# Patient Record
Sex: FEMALE | Race: WHITE | NOT HISPANIC OR LATINO
[De-identification: names, ages, dates, MRNs, and addresses within clinical notes are randomized per-mention and may not be internally consistent; named-entity substitution may affect disease eponyms.]

---

## 2019-04-10 ENCOUNTER — HOSPITAL (OUTPATIENT)
Dept: OTHER | Age: 34
End: 2019-04-10
Attending: OBSTETRICS & GYNECOLOGY

## 2020-09-14 PROBLEM — O09.819 PREGNANCY RESULTING FROM ASSISTED REPRODUCTIVE TECHNOLOGY, ANTEPARTUM: Status: ACTIVE | Noted: 2020-09-14

## 2020-09-14 PROBLEM — O09.819 PREGNANCY RESULTING FROM ASSISTED REPRODUCTIVE TECHNOLOGY, ANTEPARTUM (HCC): Status: ACTIVE | Noted: 2020-09-14

## 2020-09-14 PROBLEM — O09.519 SUPERVISION OF HIGH-RISK PREGNANCY OF ELDERLY PRIMIGRAVIDA (HCC): Status: ACTIVE | Noted: 2020-09-14

## 2020-09-14 PROBLEM — O09.519 SUPERVISION OF HIGH-RISK PREGNANCY OF ELDERLY PRIMIGRAVIDA: Status: ACTIVE | Noted: 2020-09-14

## 2021-12-22 ENCOUNTER — HOSPITAL ENCOUNTER (OUTPATIENT)
Age: 36
Discharge: HOME | End: 2021-12-22
Payer: COMMERCIAL

## 2021-12-22 VITALS
OXYGEN SATURATION: 100 % | WEIGHT: 143 LBS | SYSTOLIC BLOOD PRESSURE: 139 MMHG | HEART RATE: 79 BPM | RESPIRATION RATE: 18 BRPM | TEMPERATURE: 99 F | HEIGHT: 69 IN | DIASTOLIC BLOOD PRESSURE: 76 MMHG | BODY MASS INDEX: 21.18 KG/M2

## 2021-12-22 DIAGNOSIS — Z20.822 ENCOUNTER FOR SCREENING LABORATORY TESTING FOR COVID-19 VIRUS: Primary | ICD-10-CM

## 2021-12-22 DIAGNOSIS — J02.0 STREPTOCOCCAL SORE THROAT: ICD-10-CM

## 2021-12-22 PROCEDURE — U0002 COVID-19 LAB TEST NON-CDC: HCPCS | Performed by: NURSE PRACTITIONER

## 2021-12-22 PROCEDURE — 99213 OFFICE O/P EST LOW 20 MIN: CPT | Performed by: NURSE PRACTITIONER

## 2021-12-22 PROCEDURE — 87880 STREP A ASSAY W/OPTIC: CPT | Performed by: NURSE PRACTITIONER

## 2021-12-22 RX ORDER — AMOXICILLIN 500 MG/1
500 TABLET, FILM COATED ORAL 2 TIMES DAILY
Qty: 20 TABLET | Refills: 0 | Status: SHIPPED | OUTPATIENT
Start: 2021-12-22 | End: 2022-01-01

## 2021-12-22 NOTE — ED PROVIDER NOTES
Patient Seen in: [facility]      History   Patient presents with:  Fatigue    Stated Complaint: Covid Testing - [phone]    Subjective:   HPI    48-year-old female with a history of asthma, here today with complaints of a sore throat, jeremías distress. HEENT: Head is normocephalic atraumatic. No scleral injection. Posterior oropharynx reveals bilateral tonsillar enlargement and erythema without exudates. No unilateral tonsillar swelling or uvula deviation.   Tympanic membranes gray witho meet SIRS criteria. Rapid strep test is positive. Does not appear clinically dehydrated, tolerating oral intake  Prescription given for amoxicillin and instructions given on use. Counseled patient on symptomatic treatments.   Recommend follow-up with PCP

## 2022-02-19 ENCOUNTER — HOSPITAL ENCOUNTER (OUTPATIENT)
Age: 37
Discharge: HOME | End: 2022-02-19
Payer: COMMERCIAL

## 2022-02-19 VITALS
HEART RATE: 78 BPM | OXYGEN SATURATION: 100 % | RESPIRATION RATE: 18 BRPM | TEMPERATURE: 98 F | DIASTOLIC BLOOD PRESSURE: 75 MMHG | SYSTOLIC BLOOD PRESSURE: 112 MMHG

## 2022-02-19 DIAGNOSIS — J98.8 VIRAL RESPIRATORY ILLNESS: Primary | ICD-10-CM

## 2022-02-19 DIAGNOSIS — B97.89 VIRAL RESPIRATORY ILLNESS: Primary | ICD-10-CM

## 2022-02-19 DIAGNOSIS — Z20.822 ENCOUNTER FOR SCREENING LABORATORY TESTING FOR COVID-19 VIRUS: ICD-10-CM

## 2022-02-19 LAB
POCT INFLUENZA A: NEGATIVE
POCT INFLUENZA B: NEGATIVE
S PYO AG THROAT QL: NEGATIVE
SARS-COV-2 RNA RESP QL NAA+PROBE: NOT DETECTED

## 2022-02-19 PROCEDURE — 87502 INFLUENZA DNA AMP PROBE: CPT | Performed by: NURSE PRACTITIONER

## 2022-02-19 PROCEDURE — 99213 OFFICE O/P EST LOW 20 MIN: CPT | Performed by: NURSE PRACTITIONER

## 2022-02-19 PROCEDURE — U0002 COVID-19 LAB TEST NON-CDC: HCPCS | Performed by: NURSE PRACTITIONER

## 2022-02-19 PROCEDURE — 87880 STREP A ASSAY W/OPTIC: CPT | Performed by: NURSE PRACTITIONER

## 2022-02-19 NOTE — ED INITIAL ASSESSMENT (HPI)
Pt presents to IC c/o of sore throat since last Sunday 2/13. Pt denies fever. Pt has had two negative covid tests. Pt states that daughter has been sick. Pt repots congestion and runny nose.

## 2023-04-18 ENCOUNTER — HOSPITAL ENCOUNTER (OUTPATIENT)
Age: 38
Discharge: HOME | End: 2023-04-18
Payer: COMMERCIAL

## 2023-04-18 ENCOUNTER — APPOINTMENT (OUTPATIENT)
Dept: ULTRASOUND IMAGING | Age: 38
End: 2023-04-18
Attending: NURSE PRACTITIONER
Payer: COMMERCIAL

## 2023-04-18 VITALS
SYSTOLIC BLOOD PRESSURE: 112 MMHG | DIASTOLIC BLOOD PRESSURE: 84 MMHG | HEART RATE: 84 BPM | RESPIRATION RATE: 18 BRPM | OXYGEN SATURATION: 100 % | TEMPERATURE: 98 F

## 2023-04-18 DIAGNOSIS — R79.89 POSITIVE D DIMER: ICD-10-CM

## 2023-04-18 DIAGNOSIS — S80.12XA CONTUSION OF LEFT LOWER EXTREMITY, INITIAL ENCOUNTER: Primary | ICD-10-CM

## 2023-04-18 DIAGNOSIS — M79.89 LEFT LEG SWELLING: ICD-10-CM

## 2023-04-18 LAB
BUN BLD-MCNC: 5 MG/DL (ref 7–18)
CHLORIDE BLD-SCNC: 102 MMOL/L (ref 98–112)
CO2 BLD-SCNC: 26 MMOL/L (ref 21–32)
CREAT BLD-MCNC: 0.5 MG/DL
DDIMER WHOLE BLOOD: 568 NG/ML DDU (ref ?–400)
GFR SERPLBLD BASED ON 1.73 SQ M-ARVRAT: 123 ML/MIN/1.73M2 (ref 60–?)
GLUCOSE BLD-MCNC: 129 MG/DL (ref 70–99)
HCT VFR BLD CALC: 43 %
ISTAT IONIZED CALCIUM FOR CHEM 8: 1.2 MMOL/L (ref 1.12–1.32)
POTASSIUM BLD-SCNC: 3.8 MMOL/L (ref 3.6–5.1)
SODIUM BLD-SCNC: 141 MMOL/L (ref 136–145)

## 2023-04-18 PROCEDURE — 80047 BASIC METABLC PNL IONIZED CA: CPT | Performed by: NURSE PRACTITIONER

## 2023-04-18 PROCEDURE — 99213 OFFICE O/P EST LOW 20 MIN: CPT | Performed by: NURSE PRACTITIONER

## 2023-04-18 PROCEDURE — 93971 EXTREMITY STUDY: CPT | Performed by: NURSE PRACTITIONER

## 2023-04-18 RX ORDER — LEVOTHYROXINE SODIUM 0.03 MG/1
TABLET ORAL
COMMUNITY
Start: 2023-04-08

## 2023-04-18 RX ORDER — NORETHINDRONE AND ETHINYL ESTRADIOL TABLETS 0.4-0.035
KIT ORAL
COMMUNITY
Start: 2023-04-04

## 2023-04-18 NOTE — ED INITIAL ASSESSMENT (HPI)
Pt c/o left sided leg pain on \"outside of left knee\" pt unsure if she injured the knee denies swelling. Visible bruising and pt states it is tender.

## 2023-07-24 LAB
ANTIBODY SCREEN OB: NEGATIVE
HEPATITIS B SURFACE ANTIGEN OB: NEGATIVE
HIV RESULT OB: NEGATIVE
SYPHILIS-TOTAL QUAL: NONREACTIVE

## 2023-08-28 ENCOUNTER — HOSPITAL ENCOUNTER (OUTPATIENT)
Age: 38
Discharge: HOME | End: 2023-08-28
Payer: COMMERCIAL

## 2023-08-28 VITALS
DIASTOLIC BLOOD PRESSURE: 73 MMHG | SYSTOLIC BLOOD PRESSURE: 117 MMHG | HEART RATE: 84 BPM | OXYGEN SATURATION: 100 % | TEMPERATURE: 100 F | RESPIRATION RATE: 16 BRPM

## 2023-08-28 DIAGNOSIS — Z3A.16 16 WEEKS GESTATION OF PREGNANCY: ICD-10-CM

## 2023-08-28 DIAGNOSIS — U07.1 COVID-19 VIRUS INFECTION: Primary | ICD-10-CM

## 2023-08-28 DIAGNOSIS — Z20.822 ENCOUNTER FOR LABORATORY TESTING FOR COVID-19 VIRUS: ICD-10-CM

## 2023-08-28 LAB
S PYO AG THROAT QL: NEGATIVE
SARS-COV-2 RNA RESP QL NAA+PROBE: DETECTED

## 2023-08-28 PROCEDURE — 87880 STREP A ASSAY W/OPTIC: CPT | Performed by: PHYSICIAN ASSISTANT

## 2023-08-28 PROCEDURE — 99213 OFFICE O/P EST LOW 20 MIN: CPT | Performed by: PHYSICIAN ASSISTANT

## 2023-08-28 PROCEDURE — U0002 COVID-19 LAB TEST NON-CDC: HCPCS | Performed by: PHYSICIAN ASSISTANT

## 2023-11-13 LAB
AMB EXT TREPONEMAL ANTIBODIES: NONREACTIVE
HIV RESULT OB: NEGATIVE

## 2023-11-19 ENCOUNTER — HOSPITAL ENCOUNTER (OUTPATIENT)
Age: 38
Discharge: HOME | End: 2023-11-19
Payer: COMMERCIAL

## 2023-11-19 VITALS
RESPIRATION RATE: 16 BRPM | HEART RATE: 78 BPM | TEMPERATURE: 98 F | SYSTOLIC BLOOD PRESSURE: 119 MMHG | OXYGEN SATURATION: 100 % | DIASTOLIC BLOOD PRESSURE: 63 MMHG

## 2023-11-19 DIAGNOSIS — B34.9 VIRAL ILLNESS: ICD-10-CM

## 2023-11-19 DIAGNOSIS — Z11.52 ENCOUNTER FOR SCREENING LABORATORY TESTING FOR COVID-19 VIRUS: Primary | ICD-10-CM

## 2023-11-19 LAB
S PYO AG THROAT QL: NEGATIVE
SARS-COV-2 RNA RESP QL NAA+PROBE: NOT DETECTED

## 2023-11-19 PROCEDURE — U0002 COVID-19 LAB TEST NON-CDC: HCPCS | Performed by: NURSE PRACTITIONER

## 2023-11-19 PROCEDURE — 87880 STREP A ASSAY W/OPTIC: CPT | Performed by: NURSE PRACTITIONER

## 2023-11-19 PROCEDURE — 99213 OFFICE O/P EST LOW 20 MIN: CPT | Performed by: NURSE PRACTITIONER

## 2023-11-19 RX ORDER — ASPIRIN 81 MG/1
81 TABLET ORAL DAILY
COMMUNITY
Start: 2023-09-25

## 2023-11-19 NOTE — ED INITIAL ASSESSMENT (HPI)
Patient is here with complaints of a sore throat and a cough for the last few days. She also has some nasal congestion.

## 2024-01-02 ENCOUNTER — HOSPITAL ENCOUNTER (OUTPATIENT)
Age: 39
Discharge: HOME | End: 2024-01-02
Payer: COMMERCIAL

## 2024-01-02 VITALS
TEMPERATURE: 99 F | OXYGEN SATURATION: 100 % | SYSTOLIC BLOOD PRESSURE: 122 MMHG | DIASTOLIC BLOOD PRESSURE: 67 MMHG | RESPIRATION RATE: 20 BRPM | HEIGHT: 69 IN | HEART RATE: 98 BPM | WEIGHT: 165 LBS | BODY MASS INDEX: 24.44 KG/M2

## 2024-01-02 DIAGNOSIS — J98.8 VIRAL RESPIRATORY ILLNESS: Primary | ICD-10-CM

## 2024-01-02 DIAGNOSIS — J98.01 BRONCHOSPASM: ICD-10-CM

## 2024-01-02 DIAGNOSIS — B97.89 VIRAL RESPIRATORY ILLNESS: Primary | ICD-10-CM

## 2024-01-02 LAB — SARS-COV-2 RNA RESP QL NAA+PROBE: NOT DETECTED

## 2024-01-02 PROCEDURE — 99213 OFFICE O/P EST LOW 20 MIN: CPT | Performed by: NURSE PRACTITIONER

## 2024-01-02 PROCEDURE — U0002 COVID-19 LAB TEST NON-CDC: HCPCS | Performed by: NURSE PRACTITIONER

## 2024-01-02 RX ORDER — ALBUTEROL SULFATE 90 UG/1
2 AEROSOL, METERED RESPIRATORY (INHALATION) EVERY 4 HOURS PRN
Qty: 1 EACH | Refills: 0 | Status: SHIPPED | OUTPATIENT
Start: 2024-01-02 | End: 2024-02-01

## 2024-01-02 RX ORDER — PREDNISONE 20 MG/1
40 TABLET ORAL DAILY
Qty: 8 TABLET | Refills: 0 | Status: SHIPPED | OUTPATIENT
Start: 2024-01-02 | End: 2024-01-06

## 2024-01-02 NOTE — ED INITIAL ASSESSMENT (HPI)
Pt presents to the IC with c/o a cough since November, getting worse in the last 2 weeks. Hx of asthma. Cough is productive. Pt notes nasal congestion in the 2 days. No fevers, sore throat or ear pain.

## 2024-01-02 NOTE — DISCHARGE INSTRUCTIONS
Make sure to stay well hydrated with clear fluids. You can use Tylenol or Motrin every 6 hours to control fever or discomfort. You can use both Tylenol and Motrin, but alternate them so that you're getting one every 3 hours. Warm salt water gargles, throat lozenges, and warmed beverages can be additionally helpful for a sore throat. Using a humidifier in the bedroom at night, and sleeping propped up on pillows/somewhat of an incline can also be helpful. Cover your cough and wash your hands frequently to prevent the spread of infection. Follow up with your primary care provider in the next 2-3 days. Seek additional care in the ER for fever that is not controlled with Tylenol and Motrin, difficulty breathing or shortness of breath, chest pain, or any new/worsening symptoms.

## 2024-01-02 NOTE — ED PROVIDER NOTES
Patient Seen in: [facility]    History   CC: cough  HPI: Nara Murillo 38 year old female  who presents c/o cough which has been present for the last 1.5 months.  Feels cough may have gotten worse in the last 2 weeks.  History of asthma and states she feels as though the worsening in cough has been her triggered asthma.  Some congestion.  No fever, sore throat, difficulty breathing, chest pain or hemoptysis. +34wks gestation.     Past Medical History:   Diagnosis Date    Asthma     dogs and cats are triggers    Family history of breast cancer in mother     Pt underwent hereditary genetic testing and she was negative.    Male infertility      had oligospermia; seen by Dr. Carlos at [facility]    Subclinical hypothyroidism        Past Surgical History:   Procedure Laterality Date    APPENDECTOMY       Open Exploratory, not ruptured.      2021    LAPAROSCOPIC REMOVAL Left 2014    Paratubal cyst     WISDOM TEETH REMOVED         Family History   Problem Relation Age of Onset    Breast Cancer Mother         >49yo    Other (Pacemaker in place) Mother     Other (Atrial fibrillation) Mother     Skin cancer Father        Social History     Socioeconomic History    Marital status:    Tobacco Use    Smoking status: Never    Smokeless tobacco: Never   Vaping Use    Vaping Use: Never used   Substance and Sexual Activity    Alcohol use: Not Currently    Drug use: Never    Sexual activity: Yes     Partners: Male       ROS:  Review of Systems    Positive for stated complaint: Cough  Other systems are as noted in HPI.  Constitutional and vital signs reviewed.      All other systems reviewed and negative except as noted above.    PSFH elements reviewed from today and agreed except as otherwise stated in HPI.             Constitutional and vital signs reviewed.        Physical Exam     ED Triage Vitals [24 1347]   /67   Pulse 98   Resp 20   Temp 98.7 °F (37.1 °C)   Temp src Temporal    SpO2 100 %   O2 Device None (Room air)       Current:/67   Pulse 98   Temp 98.7 °F (37.1 °C) (Temporal)   Resp 20   Ht 175.3 cm (5' 9\")   Wt 74.8 kg   LMP 05/01/2023   SpO2 100%   BMI 24.37 kg/m²         PE:  General - Appears well, non-toxic and in NAD  Head - Appears symmetrical without deformity/swelling cranium, scalp, or facial bones  Eyes - sclera not injected, no discharge noted, no periorbital edema  ENT - EAC bilaterally without discharge, TM pearly grey with COL visualized appropriately bilaterally.   no nasal drainage noted in nares bilat, no cobblestoning to post. Pharynx.   Oropharynx clear, posterior pharynx is without erythema and without tonsilar enlargement or exudate, uvula midline, +gag, voice is clear. No trismus  Neck - no significant adenopathy, supple with trachea midline  Resp - Lung sounds clear bilaterally and wob unlabored, good aeration with equal, even expansion bilaterally   CV - RRR  Skin - no rashes or petechiae noted, pink warm and dry throughout, mmm, cap refill <2seconds  Neuro - A&O x4, steady gait  MSK - makes purposeful movements of all extremities, radial pulses 2+ bilat.  Psych - Interactive and appropriate      ED Course     Labs Reviewed   RAPID SARS-COV-2 BY PCR - Normal       MDM     Rapid covid PCR negative. Discussed CXR which pt declines d/t 34wks gestation pregnancy. Feels like asthma exacerbation and relieved with Albuterol per pt. Short course Prednisone reviewed, pregnancy cat C and precautions/risks on use reviewed.  Patient is agreeable to prednisone.  Discussed close f/u and ED/return precautions. Pt demonstrates understanding of information and agrees w/ poc.       Disposition and Plan     Clinical Impression:  1. Viral respiratory illness    2. Bronchospasm        Disposition:  Discharge    Follow-up:  Sommer Soto MD    Schedule an appointment as soon as possible for a visit in 2 days        Medications Prescribed:  Current Discharge  Medication List        START taking these medications    Details   predniSONE 20 MG Oral Tab Take 2 tablets (40 mg total) by mouth daily for 4 days.  Qty: 8 tablet, Refills: 0

## 2024-01-12 LAB — STREP GP B CULT OB: NEGATIVE

## 2024-01-31 ENCOUNTER — TELEPHONE (OUTPATIENT)
Dept: OBGYN UNIT | Facility: HOSPITAL | Age: 39
End: 2024-01-31

## 2024-02-03 ENCOUNTER — ANESTHESIA EVENT (OUTPATIENT)
Dept: OBGYN UNIT | Facility: HOSPITAL | Age: 39
End: 2024-02-03
Payer: COMMERCIAL

## 2024-02-03 ENCOUNTER — HOSPITAL ENCOUNTER (INPATIENT)
Facility: HOSPITAL | Age: 39
LOS: 3 days | Discharge: HOME | End: 2024-02-06
Attending: OBSTETRICS & GYNECOLOGY | Admitting: OBSTETRICS & GYNECOLOGY
Payer: COMMERCIAL

## 2024-02-03 ENCOUNTER — ANESTHESIA (OUTPATIENT)
Dept: OBGYN UNIT | Facility: HOSPITAL | Age: 39
End: 2024-02-03
Payer: COMMERCIAL

## 2024-02-03 PROBLEM — Z34.90 PREGNANCY: Status: ACTIVE | Noted: 2024-02-03

## 2024-02-03 PROBLEM — Z34.90 PREGNANCY (HCC): Status: ACTIVE | Noted: 2024-02-03

## 2024-02-03 LAB
ANTIBODY SCREEN: NEGATIVE
BASOPHILS # BLD AUTO: 0.02 X10(3) UL (ref 0–0.2)
BASOPHILS # BLD AUTO: 0.04 X10(3) UL (ref 0–0.2)
BASOPHILS NFR BLD AUTO: 0.1 %
BASOPHILS NFR BLD AUTO: 0.5 %
DEPRECATED RDW RBC AUTO: 46.6 FL (ref 35.1–46.3)
DEPRECATED RDW RBC AUTO: 47.3 FL (ref 35.1–46.3)
EOSINOPHIL # BLD AUTO: 0.01 X10(3) UL (ref 0–0.7)
EOSINOPHIL # BLD AUTO: 0.15 X10(3) UL (ref 0–0.7)
EOSINOPHIL NFR BLD AUTO: 0.1 %
EOSINOPHIL NFR BLD AUTO: 1.9 %
ERYTHROCYTE [DISTWIDTH] IN BLOOD BY AUTOMATED COUNT: 13.1 % (ref 11–15)
ERYTHROCYTE [DISTWIDTH] IN BLOOD BY AUTOMATED COUNT: 13.2 % (ref 11–15)
HCT VFR BLD AUTO: 34.7 %
HCT VFR BLD AUTO: 38 %
HGB BLD-MCNC: 11.7 G/DL
HGB BLD-MCNC: 12.8 G/DL
IMM GRANULOCYTES # BLD AUTO: 0.03 X10(3) UL (ref 0–1)
IMM GRANULOCYTES # BLD AUTO: 0.05 X10(3) UL (ref 0–1)
IMM GRANULOCYTES NFR BLD: 0.4 %
IMM GRANULOCYTES NFR BLD: 0.4 %
LYMPHOCYTES # BLD AUTO: 0.79 X10(3) UL (ref 1–4)
LYMPHOCYTES # BLD AUTO: 1.74 X10(3) UL (ref 1–4)
LYMPHOCYTES NFR BLD AUTO: 22.4 %
LYMPHOCYTES NFR BLD AUTO: 5.9 %
MCH RBC QN AUTO: 32.7 PG (ref 26–34)
MCH RBC QN AUTO: 32.7 PG (ref 26–34)
MCHC RBC AUTO-ENTMCNC: 33.7 G/DL (ref 31–37)
MCHC RBC AUTO-ENTMCNC: 33.7 G/DL (ref 31–37)
MCV RBC AUTO: 96.9 FL
MCV RBC AUTO: 96.9 FL
MONOCYTES # BLD AUTO: 0.39 X10(3) UL (ref 0.1–1)
MONOCYTES # BLD AUTO: 0.83 X10(3) UL (ref 0.1–1)
MONOCYTES NFR BLD AUTO: 10.7 %
MONOCYTES NFR BLD AUTO: 2.9 %
NEUTROPHILS # BLD AUTO: 12.2 X10 (3) UL (ref 1.5–7.7)
NEUTROPHILS # BLD AUTO: 12.2 X10(3) UL (ref 1.5–7.7)
NEUTROPHILS # BLD AUTO: 4.98 X10 (3) UL (ref 1.5–7.7)
NEUTROPHILS # BLD AUTO: 4.98 X10(3) UL (ref 1.5–7.7)
NEUTROPHILS NFR BLD AUTO: 64.1 %
NEUTROPHILS NFR BLD AUTO: 90.6 %
PLATELET # BLD AUTO: 241 10(3)UL (ref 150–450)
PLATELET # BLD AUTO: 266 10(3)UL (ref 150–450)
RBC # BLD AUTO: 3.58 X10(6)UL
RBC # BLD AUTO: 3.92 X10(6)UL
RH BLOOD TYPE: POSITIVE
RH BLOOD TYPE: POSITIVE
WBC # BLD AUTO: 13.5 X10(3) UL (ref 4–11)
WBC # BLD AUTO: 7.8 X10(3) UL (ref 4–11)

## 2024-02-03 PROCEDURE — 86901 BLOOD TYPING SEROLOGIC RH(D): CPT | Performed by: OBSTETRICS & GYNECOLOGY

## 2024-02-03 PROCEDURE — 86850 RBC ANTIBODY SCREEN: CPT | Performed by: OBSTETRICS & GYNECOLOGY

## 2024-02-03 PROCEDURE — 86900 BLOOD TYPING SEROLOGIC ABO: CPT | Performed by: OBSTETRICS & GYNECOLOGY

## 2024-02-03 PROCEDURE — 88302 TISSUE EXAM BY PATHOLOGIST: CPT | Performed by: OBSTETRICS & GYNECOLOGY

## 2024-02-03 PROCEDURE — 85025 COMPLETE CBC W/AUTO DIFF WBC: CPT | Performed by: OBSTETRICS & GYNECOLOGY

## 2024-02-03 PROCEDURE — S0028 INJECTION, FAMOTIDINE, 20 MG: HCPCS | Performed by: OBSTETRICS & GYNECOLOGY

## 2024-02-03 PROCEDURE — 0UT70ZZ RESECTION OF BILATERAL FALLOPIAN TUBES, OPEN APPROACH: ICD-10-PCS | Performed by: OBSTETRICS & GYNECOLOGY

## 2024-02-03 RX ORDER — ONDANSETRON 2 MG/ML
4 INJECTION INTRAMUSCULAR; INTRAVENOUS ONCE AS NEEDED
Status: ACTIVE | OUTPATIENT
Start: 2024-02-03 | End: 2024-02-03

## 2024-02-03 RX ORDER — PROCHLORPERAZINE EDISYLATE 5 MG/ML
5 INJECTION INTRAMUSCULAR; INTRAVENOUS ONCE AS NEEDED
Status: ACTIVE | OUTPATIENT
Start: 2024-02-03 | End: 2024-02-03

## 2024-02-03 RX ORDER — LEVOTHYROXINE SODIUM 0.03 MG/1
25 TABLET ORAL
Status: DISCONTINUED | OUTPATIENT
Start: 2024-02-04 | End: 2024-02-06

## 2024-02-03 RX ORDER — ACETAMINOPHEN 500 MG
1000 TABLET ORAL EVERY 6 HOURS
Status: DISCONTINUED | OUTPATIENT
Start: 2024-02-03 | End: 2024-02-06

## 2024-02-03 RX ORDER — ONDANSETRON 2 MG/ML
INJECTION INTRAMUSCULAR; INTRAVENOUS AS NEEDED
Status: DISCONTINUED | OUTPATIENT
Start: 2024-02-03 | End: 2024-02-03 | Stop reason: SURG

## 2024-02-03 RX ORDER — CITRIC ACID/SODIUM CITRATE 334-500MG
30 SOLUTION, ORAL ORAL ONCE
Status: COMPLETED | OUTPATIENT
Start: 2024-02-03 | End: 2024-02-03

## 2024-02-03 RX ORDER — KETOROLAC TROMETHAMINE 30 MG/ML
30 INJECTION, SOLUTION INTRAMUSCULAR; INTRAVENOUS ONCE
Status: COMPLETED | OUTPATIENT
Start: 2024-02-03 | End: 2024-02-03

## 2024-02-03 RX ORDER — BISACODYL 10 MG
10 SUPPOSITORY, RECTAL RECTAL ONCE AS NEEDED
Status: DISCONTINUED | OUTPATIENT
Start: 2024-02-03 | End: 2024-02-06

## 2024-02-03 RX ORDER — CHOLECALCIFEROL (VITAMIN D3) 25 MCG
1 TABLET,CHEWABLE ORAL DAILY
Status: DISCONTINUED | OUTPATIENT
Start: 2024-02-03 | End: 2024-02-06

## 2024-02-03 RX ORDER — SODIUM CHLORIDE, SODIUM LACTATE, POTASSIUM CHLORIDE, CALCIUM CHLORIDE 600; 310; 30; 20 MG/100ML; MG/100ML; MG/100ML; MG/100ML
125 INJECTION, SOLUTION INTRAVENOUS CONTINUOUS
Status: DISCONTINUED | OUTPATIENT
Start: 2024-02-03 | End: 2024-02-03 | Stop reason: HOSPADM

## 2024-02-03 RX ORDER — METOCLOPRAMIDE HYDROCHLORIDE 5 MG/ML
10 INJECTION INTRAMUSCULAR; INTRAVENOUS ONCE
Status: COMPLETED | OUTPATIENT
Start: 2024-02-03 | End: 2024-02-03

## 2024-02-03 RX ORDER — KETOROLAC TROMETHAMINE 30 MG/ML
30 INJECTION, SOLUTION INTRAMUSCULAR; INTRAVENOUS EVERY 6 HOURS
Status: DISPENSED | OUTPATIENT
Start: 2024-02-03 | End: 2024-02-04

## 2024-02-03 RX ORDER — AMMONIA INHALANTS 0.04 G/.3ML
0.3 INHALANT RESPIRATORY (INHALATION) AS NEEDED
Status: DISCONTINUED | OUTPATIENT
Start: 2024-02-03 | End: 2024-02-06

## 2024-02-03 RX ORDER — NALBUPHINE HYDROCHLORIDE 10 MG/ML
2.5 INJECTION, SOLUTION INTRAMUSCULAR; INTRAVENOUS; SUBCUTANEOUS EVERY 4 HOURS PRN
Status: ACTIVE | OUTPATIENT
Start: 2024-02-03 | End: 2024-02-04

## 2024-02-03 RX ORDER — FAMOTIDINE 10 MG/ML
20 INJECTION, SOLUTION INTRAVENOUS ONCE
Status: COMPLETED | OUTPATIENT
Start: 2024-02-03 | End: 2024-02-03

## 2024-02-03 RX ORDER — DOCUSATE SODIUM 100 MG/1
100 CAPSULE, LIQUID FILLED ORAL
Status: DISCONTINUED | OUTPATIENT
Start: 2024-02-03 | End: 2024-02-06

## 2024-02-03 RX ORDER — DIPHENHYDRAMINE HYDROCHLORIDE 50 MG/ML
12.5 INJECTION INTRAMUSCULAR; INTRAVENOUS EVERY 4 HOURS PRN
Status: ACTIVE | OUTPATIENT
Start: 2024-02-03 | End: 2024-02-04

## 2024-02-03 RX ORDER — NALOXONE HYDROCHLORIDE 0.4 MG/ML
0.08 INJECTION, SOLUTION INTRAMUSCULAR; INTRAVENOUS; SUBCUTANEOUS
Status: ACTIVE | OUTPATIENT
Start: 2024-02-03 | End: 2024-02-04

## 2024-02-03 RX ORDER — CEFAZOLIN SODIUM/WATER 2 G/20 ML
2 SYRINGE (ML) INTRAVENOUS EVERY 8 HOURS
Status: COMPLETED | OUTPATIENT
Start: 2024-02-03 | End: 2024-02-04

## 2024-02-03 RX ORDER — SIMETHICONE 80 MG
80 TABLET,CHEWABLE ORAL 3 TIMES DAILY PRN
Status: DISCONTINUED | OUTPATIENT
Start: 2024-02-03 | End: 2024-02-06

## 2024-02-03 RX ORDER — HYDROMORPHONE HYDROCHLORIDE 1 MG/ML
0.6 INJECTION, SOLUTION INTRAMUSCULAR; INTRAVENOUS; SUBCUTANEOUS
Status: ACTIVE | OUTPATIENT
Start: 2024-02-03 | End: 2024-02-04

## 2024-02-03 RX ORDER — HYDROMORPHONE HYDROCHLORIDE 1 MG/ML
0.2 INJECTION, SOLUTION INTRAMUSCULAR; INTRAVENOUS; SUBCUTANEOUS EVERY 2 HOUR PRN
Status: DISCONTINUED | OUTPATIENT
Start: 2024-02-03 | End: 2024-02-06

## 2024-02-03 RX ORDER — DIPHENHYDRAMINE HCL 25 MG
25 CAPSULE ORAL EVERY 4 HOURS PRN
Status: ACTIVE | OUTPATIENT
Start: 2024-02-03 | End: 2024-02-04

## 2024-02-03 RX ORDER — HYDROMORPHONE HYDROCHLORIDE 2 MG/1
2 TABLET ORAL EVERY 4 HOURS PRN
Status: DISCONTINUED | OUTPATIENT
Start: 2024-02-03 | End: 2024-02-06

## 2024-02-03 RX ORDER — ACETAMINOPHEN 500 MG
1000 TABLET ORAL ONCE
Status: COMPLETED | OUTPATIENT
Start: 2024-02-03 | End: 2024-02-03

## 2024-02-03 RX ORDER — IBUPROFEN 600 MG/1
600 TABLET ORAL EVERY 6 HOURS
Status: DISCONTINUED | OUTPATIENT
Start: 2024-02-04 | End: 2024-02-06

## 2024-02-03 RX ORDER — HYDROMORPHONE HYDROCHLORIDE 1 MG/ML
0.4 INJECTION, SOLUTION INTRAMUSCULAR; INTRAVENOUS; SUBCUTANEOUS
Status: ACTIVE | OUTPATIENT
Start: 2024-02-03 | End: 2024-02-04

## 2024-02-03 RX ORDER — BUPIVACAINE HYDROCHLORIDE 7.5 MG/ML
INJECTION, SOLUTION INTRASPINAL AS NEEDED
Status: DISCONTINUED | OUTPATIENT
Start: 2024-02-03 | End: 2024-02-03 | Stop reason: SURG

## 2024-02-03 RX ORDER — EPHEDRINE SULFATE 50 MG/ML
INJECTION INTRAVENOUS AS NEEDED
Status: DISCONTINUED | OUTPATIENT
Start: 2024-02-03 | End: 2024-02-03 | Stop reason: SURG

## 2024-02-03 RX ORDER — DEXAMETHASONE SODIUM PHOSPHATE 4 MG/ML
VIAL (ML) INJECTION AS NEEDED
Status: DISCONTINUED | OUTPATIENT
Start: 2024-02-03 | End: 2024-02-03 | Stop reason: SURG

## 2024-02-03 RX ORDER — FAMOTIDINE 20 MG/1
20 TABLET, FILM COATED ORAL ONCE
Status: COMPLETED | OUTPATIENT
Start: 2024-02-03 | End: 2024-02-03

## 2024-02-03 RX ORDER — HYDROCODONE BITARTRATE AND ACETAMINOPHEN 7.5; 325 MG/1; MG/1
1 TABLET ORAL EVERY 6 HOURS PRN
Status: ACTIVE | OUTPATIENT
Start: 2024-02-03 | End: 2024-02-04

## 2024-02-03 RX ORDER — DEXTROSE, SODIUM CHLORIDE, SODIUM LACTATE, POTASSIUM CHLORIDE, AND CALCIUM CHLORIDE 5; .6; .31; .03; .02 G/100ML; G/100ML; G/100ML; G/100ML; G/100ML
INJECTION, SOLUTION INTRAVENOUS CONTINUOUS
Status: DISCONTINUED | OUTPATIENT
Start: 2024-02-03 | End: 2024-02-06

## 2024-02-03 RX ORDER — HALOPERIDOL 5 MG/ML
0.5 INJECTION INTRAMUSCULAR ONCE AS NEEDED
Status: ACTIVE | OUTPATIENT
Start: 2024-02-03 | End: 2024-02-03

## 2024-02-03 RX ORDER — ONDANSETRON 2 MG/ML
4 INJECTION INTRAMUSCULAR; INTRAVENOUS EVERY 6 HOURS PRN
Status: DISCONTINUED | OUTPATIENT
Start: 2024-02-03 | End: 2024-02-06

## 2024-02-03 RX ORDER — CEFAZOLIN SODIUM/WATER 2 G/20 ML
2 SYRINGE (ML) INTRAVENOUS ONCE
Status: COMPLETED | OUTPATIENT
Start: 2024-02-03 | End: 2024-02-03

## 2024-02-03 RX ORDER — NALBUPHINE HYDROCHLORIDE 10 MG/ML
2.5 INJECTION, SOLUTION INTRAMUSCULAR; INTRAVENOUS; SUBCUTANEOUS
OUTPATIENT
Start: 2024-02-03

## 2024-02-03 RX ORDER — GABAPENTIN 300 MG/1
300 CAPSULE ORAL EVERY 8 HOURS PRN
Status: DISCONTINUED | OUTPATIENT
Start: 2024-02-03 | End: 2024-02-06

## 2024-02-03 RX ORDER — ONDANSETRON 2 MG/ML
4 INJECTION INTRAMUSCULAR; INTRAVENOUS EVERY 6 HOURS PRN
Status: DISCONTINUED | OUTPATIENT
Start: 2024-02-03 | End: 2024-02-03 | Stop reason: HOSPADM

## 2024-02-03 RX ORDER — MORPHINE SULFATE 1 MG/ML
INJECTION, SOLUTION EPIDURAL; INTRATHECAL; INTRAVENOUS AS NEEDED
Status: DISCONTINUED | OUTPATIENT
Start: 2024-02-03 | End: 2024-02-03 | Stop reason: SURG

## 2024-02-03 RX ORDER — METOCLOPRAMIDE 10 MG/1
10 TABLET ORAL ONCE
Status: COMPLETED | OUTPATIENT
Start: 2024-02-03 | End: 2024-02-03

## 2024-02-03 RX ORDER — SODIUM CHLORIDE, SODIUM LACTATE, POTASSIUM CHLORIDE, CALCIUM CHLORIDE 600; 310; 30; 20 MG/100ML; MG/100ML; MG/100ML; MG/100ML
INJECTION, SOLUTION INTRAVENOUS CONTINUOUS
OUTPATIENT
Start: 2024-02-03

## 2024-02-03 RX ORDER — ONDANSETRON 2 MG/ML
4 INJECTION INTRAMUSCULAR; INTRAVENOUS ONCE AS NEEDED
OUTPATIENT
Start: 2024-02-03 | End: 2024-02-03

## 2024-02-03 RX ORDER — LIDOCAINE HYDROCHLORIDE 10 MG/ML
INJECTION, SOLUTION EPIDURAL; INFILTRATION; INTRACAUDAL; PERINEURAL AS NEEDED
Status: DISCONTINUED | OUTPATIENT
Start: 2024-02-03 | End: 2024-02-03 | Stop reason: SURG

## 2024-02-03 RX ORDER — ACETAMINOPHEN 325 MG/1
650 TABLET ORAL EVERY 6 HOURS PRN
Status: ACTIVE | OUTPATIENT
Start: 2024-02-03 | End: 2024-02-04

## 2024-02-03 RX ORDER — HYDROCODONE BITARTRATE AND ACETAMINOPHEN 7.5; 325 MG/1; MG/1
2 TABLET ORAL EVERY 6 HOURS PRN
Status: ACTIVE | OUTPATIENT
Start: 2024-02-03 | End: 2024-02-04

## 2024-02-03 RX ADMIN — DEXAMETHASONE SODIUM PHOSPHATE 8 MG: 4 MG/ML VIAL (ML) INJECTION at 07:58:00

## 2024-02-03 RX ADMIN — ONDANSETRON 4 MG: 2 INJECTION INTRAMUSCULAR; INTRAVENOUS at 07:58:00

## 2024-02-03 RX ADMIN — MORPHINE SULFATE 0.2 MG: 1 INJECTION, SOLUTION EPIDURAL; INTRATHECAL; INTRAVENOUS at 07:55:00

## 2024-02-03 RX ADMIN — CEFAZOLIN SODIUM/WATER 2 G: 2 G/20 ML SYRINGE (ML) INTRAVENOUS at 07:58:00

## 2024-02-03 RX ADMIN — EPHEDRINE SULFATE 10 MG: 50 INJECTION INTRAVENOUS at 08:03:00

## 2024-02-03 RX ADMIN — EPHEDRINE SULFATE 10 MG: 50 INJECTION INTRAVENOUS at 08:09:00

## 2024-02-03 RX ADMIN — LIDOCAINE HYDROCHLORIDE 3 ML: 10 INJECTION, SOLUTION EPIDURAL; INFILTRATION; INTRACAUDAL; PERINEURAL at 07:52:00

## 2024-02-03 RX ADMIN — BUPIVACAINE HYDROCHLORIDE 1.6 ML: 7.5 INJECTION, SOLUTION INTRASPINAL at 07:55:00

## 2024-02-03 NOTE — ANESTHESIA POSTPROCEDURE EVALUATION
Patient: Nara Murillo    Procedure Summary       Date: 24 Room / Location: [facility] L+D OR  [facility] L+D OR    Anesthesia Start: 749 Anesthesia Stop:     Procedures:        SECTION WITH BILATERAL SALPINGECTOMY      BILATERAL SALPINGECTOMY (Bilateral) Diagnosis: (Repeat  section)    Surgeons: Stephanie Bay MD Anesthesiologist: Aisha Lozano MD    Anesthesia Type: spinal ASA Status: 2            Anesthesia Type: spinal    Vitals Value Taken Time   /94 24 0902   Temp 98 24 0905   Pulse 90 24 0904   Resp 24 24 0904   SpO2 100 % 24 09   Vitals shown include unfiled device data.    [facility] AN Post Evaluation:   Patient Evaluated in floor  Patient Participation: complete - patient participated  Level of Consciousness: awake and alert  Pain Score: 0  Pain Management: adequate  Airway Patency:patent  Dental exam unchanged from preop  Yes    Cardiovascular Status: acceptable  Respiratory Status: acceptable  Postoperative Hydration acceptable      Aisha Lozano MD  2/3/2024 9:05 AM

## 2024-02-03 NOTE — H&P
[facility]     section History & Physical    Nara Murillo Patient Status:  Surgery Admit - Inpt    1985 MRN [id number]   Location [facility] Attending Stephanie Bay MD   Hosp Day # 0 PCP Kaylin Soto MD     Date of Admission: 2/3/2024    Reason for Admission: Scheduled  section and bilateral salpingectomy    HPI:   Nara Murillo is a 38 year old  female, current EGA of 39w0d with an estimated date of delivery of: 2024, Date entered prior to episode creation who presents for scheduled  section and bilateral salpingectomy.    Pt denies N/V/F/C/CP/SOB, LOF, VB, dizziness, RUQ pain, blurry vision, HA.  (+) FM.  She reports she has been devaughn since last  on and off.      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    GBS negative. Rh positive.    History   Obstetric History:   OB History    Para Term  AB Living   2 1 1 0 0 1   SAB IAB Ectopic Multiple Live Births   0 0 0 0 1      # Outcome Date GA Lbr Jose/2nd Weight Sex Delivery Anes PTL Lv   2 Current            1 Term 21 37w3d  6 lb 5 oz (2.863 kg) F Caesarean Spinal  TINY      Complications: Abruptio Placenta      Obstetric Comments   [facility] 2019: SMA (-); Fragile X Syndrome (-), not a carrier; All other diseases (-).         Gyne History: Last pap: 2023: Negative cytology and HR-HPV not detected    Past Medical History:   Past Medical History:   Diagnosis Date    Asthma     dogs and cats are  triggers    Family history of breast cancer in mother     Pt underwent hereditary genetic testing and she was negative.    Male infertility      had oligospermia; seen by Dr. Carlos at [facility]    Subclinical hypothyroidism        Past Surgerical History:   Past Surgical History:   Procedure Laterality Date    APPENDECTOMY       Open Exploratory, not ruptured.      2021    LAPAROSCOPIC REMOVAL Left 2014    Paratubal cyst     WISDOM TEETH REMOVED         Social History:   Social History     Tobacco Use    Smoking status: Never     Passive exposure: Never    Smokeless tobacco: Never   Substance Use Topics    Alcohol use: Not Currently        Allergies/Medications:   Allergies:   No Known Allergies    Medications:  Medications Prior to Admission   Medication Sig Dispense Refill Last Dose    aspirin 81 MG Oral Tab EC Take 1 tablet (81 mg total) by mouth daily.   Past Month    levothyroxine 25 MCG Oral Tab TAKE 1 TABLET BY MOUTH IN THE MORNING, 30 MINTUES BEFORE, FOOD, BEVERAGE, OTHER MEDS.   2024    Cholecalciferol (VITAMIN D-3 OR) Take by mouth.   2024    Prenatal Vit-Fe Fumarate-FA (PRENATAL VITAMIN OR) Take by mouth.   2024    PROAIR  (90 Base) MCG/ACT Inhalation Aero Soln   2 2024    [] predniSONE 20 MG Oral Tab Take 2 tablets (40 mg total) by mouth daily for 4 days. 8 tablet 0     [] albuterol 108 (90 Base) MCG/ACT Inhalation Aero Soln Inhale 2 puffs into the lungs every 4 (four) hours as needed for Wheezing or Shortness of Breath (spastic cough). 1 each 0     PHILITH 0.4-35 MG-MCG Oral Tab TAKE 1 ACTIVE PILL DAILY AS DIRECTED (Patient not taking: Reported on 2023)            Review of Systems:   As documented in HPI      Physical Exam:   Temp:  [98.8 °F (37.1 °C)] 98.8 °F (37.1 °C)  Pulse:  [79] 79  Resp:  [16] 16  BP: (118)/(77) 118/77    Constitutional: alert and cooperative, in no apparent distress    Abdomen: gravid, nontender,gravid    Vaginal  exam: deferred    FHT assessment:   Baseline: 130 bpm   Variability: moderate   Accels:  present   Decels: variables x 1 down to a lul of 80bm for about 80sec with return to baseline   Tocos:  contractions every about 3 minute   Category: 2 tracing    Neurologic: Alert and oriented;   Psychiatric: Cooperative    Results:     Recent Results (from the past 24 hour(s))   ABORH (Blood Type)    Collection Time: 24  5:56 AM   Result Value Ref Range    ABO BLOOD TYPE O     RH BLOOD TYPE Positive    Antibody Screen    Collection Time: 24  5:56 AM   Result Value Ref Range    Antibody Screen Negative    CBC W/ DIFFERENTIAL    Collection Time: 24  5:56 AM   Result Value Ref Range    WBC 7.8 4.0 - 11.0 x10(3) uL    RBC 3.92 3.80 - 5.30 x10(6)uL    HGB 12.8 12.0 - 16.0 g/dL    HCT 38.0 35.0 - 48.0 %    MCV 96.9 80.0 - 100.0 fL    MCH 32.7 26.0 - 34.0 pg    MCHC 33.7 31.0 - 37.0 g/dL    RDW-SD 47.3 (H) 35.1 - 46.3 fL    RDW 13.2 11.0 - 15.0 %    .0 150.0 - 450.0 10(3)uL    Neutrophil Absolute Prelim 4.98 1.50 - 7.70 x10 (3) uL    Neutrophil Absolute 4.98 1.50 - 7.70 x10(3) uL    Lymphocyte Absolute 1.74 1.00 - 4.00 x10(3) uL    Monocyte Absolute 0.83 0.10 - 1.00 x10(3) uL    Eosinophil Absolute 0.15 0.00 - 0.70 x10(3) uL    Basophil Absolute 0.04 0.00 - 0.20 x10(3) uL    Immature Granulocyte Absolute 0.03 0.00 - 1.00 x10(3) uL    Neutrophil % 64.1 %    Lymphocyte % 22.4 %    Monocyte % 10.7 %    Eosinophil % 1.9 %    Basophil % 0.5 %    Immature Granulocyte % 0.4 %       No results found.      Assessment/Plan:   IUP at 39w0d for scheduled  section.  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   Patient Active Problem List   Diagnosis    Supervision of high-risk pregnancy of elderly primigravida     Pregnancy resulting from assisted reproductive technology, antepartum    Pregnancy       Anesthesia planned: spinal    Risks, benefits, alternatives and possible complications have been discussed in detail with the patient.  Risks including but not limited to pain, bleeding, infection, possibility of adhesions, damage to adjacent organs (including but not limited to ureters, bowel, bladder, major blood vessels, and nerve endings).  PT verbalized understanding and wished to proceed with a repeat C/S.  Pt interested in bilateral salpingectomy for sterilization.  She reports done with child bearing.  Discussed  meant to be permanent and not reversible.  Discussed risks, benefits, and alternatives and she verbalized understanding.    Discussed on rare occasions adhesions can prevent us from getting to the fallopian tubes and if the procedure can increase risk of harm may not be able to perform bilateral salpingectomy.  Pre-admission, admission, and post admission procedures and expectations were discussed in detail.    All questions answered; all appropriate consents will be signed at the Hospital.        Stephanie Bay MD  2/3/2024

## 2024-02-03 NOTE — OPERATIVE REPORT
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  COVID in pregnancy    Post Op Diagnosis:    same as pre-op IUP at 39 1/7 wks  Previous C/S, declined , desires repeat C/S  Desires permanent sterilization due to completion of child-bearing  Covid in pregnancy  Delivery of baby    Procedure:   repeat  LTCS and Bilateral Salpingectomy Procedure(s):   SECTION WITH BILATERAL SALPINGECTOMY  BILATERAL SALPINGECTOMY     Surgeon:  Stephanie Bay MD    Assistant Surgeon:  Beata La MD     Anesthesia: spinal Spinal    Complications: none     Antibiotics:  Ancef 2 grams preoperatively    EBL:450 ml, QBL pending    Delivery Fluids:  Crystallioid fluid    Urine Color: clear    Specimens:   Specimens (From admission, onward)      None            Intra-operative Findings: normal uterus, normal tubes bilaterally, normal ovaries bilaterally.   Live male infant, Apgars 9 & 9, weight 8 lbs, 12 oz   Position:  Nuchal Cord:  single nuchal  clear amniotic fluid noted.  Delayed cord clamping performed.      Infant:  Date of Delivery: 2/3/2024    Time of Delivery: 8:12 AM   Delivery Type:      Infant Sex  Information for the patient's :  Lidia, Ubaldo [[id number]]   male     Infant Birthweight  Information for the patient's :  Ubaldo Murillo [[id number]]   No birth weight on file.      Presentation    Position          Apgars:  1 minute: 9                5 minutes: 9                         10 minutes:      Placenta:  Date/Time of Delivery: 2/3/2024  8:13 AM     Delivery: manual extraction, no adhesions noted, placenta was removed smoothly without complications.  Placenta to Pathology: no    Cord:  Cord Gases Submitted: no  Cord Blood/Tissue Collection: no  Cord Complications: single nuchal, loosed at delivery of the fetal head    Sponge and Needle Counts:  Verified    Operative note:  Consent obtained and placed in the chart.  Risks, benefits, alternatives discussed with the patient.  Risks including but not limited to pain, bleeding, infection, damage to adjacent organs including but not limited to ureters, bowel, bladder, nerves, major blood vessels, nerve ending.  The patient verbalized understanding of risks, benefits, alternatives and wished to proceed with the  section.  The patient was brought into the operating room.  Spinal anesthesia was placed.  A Winchester catheter was placed.  The patient was tilted to her left side.  Fetal heart tones were regular and reactive.  Sequential compression devices were in place and the patient was grounded. She was then prepped and draped in a normal sterile fashion in a dorsal supine position with a leftward tilt.  A time out was performed.  An adequate level of anesthesia was ascertained.    Previous Pfannenstiel skin incision was removed and then scalpel was carried down to the subcutaneous tissues to the abdominal fascia which was incised on either side at the midline.  The fascial incision was extended upward and laterally bilaterally using Healy scissors.  Subcutaneous bleeders and perforated bleeders were clamped and cauterized.  The inferior aspect of the fascial incision was then grasped with Kocher clamps, elevated and the underlying rectus muscles were dissected off sharply.  Attention was then turned to the superior aspect of the incision, in a similar fashion was grasped and tented up with Kocher clamps and the rectus muscles were dissected off with Healy scissors.  The rectus muscles were then  in the  midline.  The peritoneum was identified and entered bluntly.  Upon entrance, the peritoneum was elevated and extended superiorly to the inferior aspect of the umbilicus and inferiorly to the superior aspect of the bladder using Metzenbaum scissors.  The bladder blade was inserted.  The vesicouterine peritoneum was identified and a bladder flap was created with Metzenbaum scissors and carried upward and laterally bilaterally.  Then using blunt dissection, a bladder flap was created and the bladder was dissected off the lower uterine segment.  A low transverse incision was made with a scalpel on the uterus.  This was carried upward and laterally bilaterally with bandage scissors.  Clear amniotic fluid was noted.  The bladder blade was removed.  The baby was found to be in the cephalic presentation.  The infant's head was guided through the incision while fundal pressure was applied by assistant atraumatically.  There was a nuchal cord noted, loosened at delivery of the fetal head.  The nose and mouth were then suctioned.  The baby's arms, body and feet followed without complication.  Delayed cord clamping was performed.  The infant was handed off to the awaiting neonatology team.  Cord blood was collected.  The placenta was then removed completely intact and manually.  No complications were noted, the placenta was removed smoothly, no adhesions noted.  The uterus was exteriorized.  The uterine cavity was then cleaned off of all clot and debris.  The uterus was closed in two layer fashion, first being interlocking, the second being imbricating using 0-Vicryl.   Good hemostasis was noted throughout.  The bladder flap was then reapproximated with 3-0 Vicryl.  Good hemostasis continued to be noted.  The gutters were cleared off of all clot and debris.      Bilateral tubes and ovaries appeared to be within normal and in the correct anatomical location.  Attention was then turned to performing the bilateral salpingectomy.   The right fallopian tube was grasped with Ruskin clamps.  An Enseal Sealer/Divider was then used to gradually cauterize and cut the broad ligament just under the fallopian tube.  This action was continued until near the ipsilateral cornua, and the tube was then cauterized and transected.  The tubal stump was cauterized.  Good hemostasis was noted.  This procedure was then repeated on the left side with good hemostasis also noted on this side.     Re-inspection of the hysterotomy, peritoneum and rectus muscles noted them to be entirely hemostatic.  The peritoneum was approximated with 2-0 Vicryl in a running stitch.  The abdominal fascia was closed in two halves using 0 Vicryl.  The subcutaneous tissue was reapproximated with 2-0 Vicryl and the skin was reapproximated with subcuticular stitch of 4-0 Monocryl.  The incision was then cleaned and then skin adhesive was placed across the incision.  The patient had tolerated the procedure well.  Sponge, lap, and needle counts were correct x2.  No complications were noted.  The placenta was inspected and the cord was noted to have three-vessel cord.  Cord blood was collected and the patient was then subsequently transferred to recovery in good condition.    The surgical assistant provided aid in exposure, hemostasis, closure, and other intraoperative technical functions to help the surgeon carry out a safe operation and optimize results.     Stephanie Bay MD  2/3/2024  9:09 AM

## 2024-02-03 NOTE — LACTATION NOTE
02/03/24 1220   Evaluation Type   Evaluation Type Inpatient   Problems identified   Problems identified Knowledge deficit   Maternal history   Maternal history Caesarean section;AMA   Breastfeeding goal   Breastfeeding goal To maintain breast milk feeding per patient goal   Maternal Assessment   Bilateral Breasts Soft;Symmetrical   Bilateral Nipples Colostrum easily expressed;Everted;Elastic   Right Nipple Abrasion   Prior breastfeeding experience (comment below) Multip   Prior BF experience: comment first child TT   Breastfeeding Assistance Breastfeeding assistance provided with permission   Pain assessment   Location/Comment denies   Treatment of Sore Nipples Deeper latch techniques;Expressed breast milk   Guidelines for use of:   Suggested use of pump For comfort as needed;Pump each time a supplement is offered   Other (comment) Sustained latch in FB hold, LB hold shallow and unable to sustain.

## 2024-02-03 NOTE — LACTATION NOTE
This note was copied from a baby's chart.     02/03/24 1220   Evaluation Type   Evaluation Type Inpatient   Problems & Assessment   Problems Diagnosed or Identified Tongue restriction   Problems: comment/detail Assisted with BF at 4 hours of age with suspected TT per MB RN, mother reports sibling with TT.   Muscle tone Appropriate for GA   Feeding Assessment   Summary Current Feeding Adlib;Breastfeeding exclusively   Breastfeeding Assessment Assisted with breastfeeding w/mother's permission;Coordinated suck/swallow;Sustained nutrititive latch w/audible swallows;Tolerated feeding well;Calm and ready to breastfeed   Breastfeeding Positions laid back;football;right breast   Latch 2   Audible Sucks/Swallows 2   Type of Nipple 2   Comfort (Breast/Nipple) 1   Hold (Positioning) 1   LATCH Score 8   Other (comment) deeper latch achieved in FB hold, regular swallows achieved.

## 2024-02-03 NOTE — PROGRESS NOTES
Patient transferred to mother/baby room 368 per cart in stable condition with baby and personal belongings.  Accompanied by  and staff.  Report given to Lory mother/baby RN.

## 2024-02-03 NOTE — PLAN OF CARE

## 2024-02-03 NOTE — ANESTHESIA PROCEDURE NOTES
Spinal Block    Date/Time: 2/3/2024 7:50 AM    Performed by: Aisha Lozano MD  Authorized by: Aisha Lozano MD      General Information and Staff    Start Time:  2/3/2024 7:50 AM  End Time:  2/3/2024 7:54 AM  Anesthesiologist:  Aisha Lozano MD  Performed by:  Anesthesiologist  Patient Location:  OB  Site identification: surface landmarks    Reason for Block: at surgeon's request and surgical anesthesia    Preanesthetic Checklist: patient identified, IV checked, risks and benefits discussed, monitors and equipment checked, pre-op evaluation, timeout performed, anesthesia consent and sterile technique used      Procedure Details    Patient Position:  Sitting  Prep: ChloraPrep    Monitoring:  Cardiac monitor  Approach:  Midline  Location:  L3-4  Injection Technique:  Single-shot    Needle    Needle Type:  Pencil-tip  Needle Gauge:  24 G  Needle Length:  3.5 in    Assessment    Sensory Level:  T4  Events: clear CSF, CSF aspirated, well tolerated and blood negative      Additional Comments

## 2024-02-03 NOTE — PROGRESS NOTES
Pt is a 38 year old female admitted to TR5/TR5-A.     Chief Complaint   Patient presents with    Scheduled       Pt is  39w1d intra-uterine pregnancy.  History obtained, consents signed. Oriented to room, staff, and plan of care.

## 2024-02-03 NOTE — ANESTHESIA PREPROCEDURE EVALUATION
Anesthesia PreOp Note    HPI:     Nara Murillo is a 38 year old female who presents for preoperative consultation requested by: Stephanie Bay MD    Date of Surgery: 2/3/2024    Procedure(s):   SECTION WITH BILATERAL SALPINGECTOMY  BILATERAL SALPINGECTOMY  Indication: Repeat  section    Relevant Problems   No relevant active problems       NPO:  Last Liquid Consumption Date: 24  Last Liquid Consumption Time: 530  Last Solid Consumption Date: 24  Last Solid Consumption Time:   Last Liquid Consumption Date: 24          History Review:  Patient Active Problem List    Diagnosis Date Noted    Pregnancy 2024    Supervision of high-risk pregnancy of elderly primigravida 2020    Pregnancy resulting from assisted reproductive technology, antepartum 2020       Past Medical History:   Diagnosis Date    Asthma     dogs and cats are triggers    Family history of breast cancer in mother     Pt underwent hereditary genetic testing and she was negative.    Male infertility      had oligospermia; seen by Dr. Carlos at [facility]    Subclinical hypothyroidism        Past Surgical History:   Procedure Laterality Date    APPENDECTOMY       Open Exploratory, not ruptured.      2021    LAPAROSCOPIC REMOVAL Left 2014    Paratubal cyst     WISDOM TEETH REMOVED         Medications Prior to Admission   Medication Sig Dispense Refill Last Dose    aspirin 81 MG Oral Tab EC Take 1 tablet (81 mg total) by mouth daily.   Past Month    levothyroxine 25 MCG Oral Tab TAKE 1 TABLET BY MOUTH IN THE MORNING, 30 MINTUES BEFORE, FOOD, BEVERAGE, OTHER MEDS.   2024    Cholecalciferol (VITAMIN D-3 OR) Take by mouth.   2024    Prenatal Vit-Fe Fumarate-FA (PRENATAL VITAMIN OR) Take by mouth.   2024    PROAIR  (90 Base) MCG/ACT Inhalation Aero Soln   2 2024    [] predniSONE 20 MG Oral Tab Take 2 tablets (40 mg total) by mouth daily for 4 days. 8 tablet  0     [] albuterol 108 (90 Base) MCG/ACT Inhalation Aero Soln Inhale 2 puffs into the lungs every 4 (four) hours as needed for Wheezing or Shortness of Breath (spastic cough). 1 each 0     PHILITH 0.4-35 MG-MCG Oral Tab TAKE 1 ACTIVE PILL DAILY AS DIRECTED (Patient not taking: Reported on 2023)        Current Facility-Administered Medications Ordered in Epic   Medication Dose Route Frequency Provider Last Rate Last Admin    lactated ringers infusion  125 mL/hr Intravenous Continuous Stephanie Bay  mL/hr at 24 0702 125 mL/hr at 24 0702    ondansetron (Zofran) 4 MG/2ML injection 4 mg  4 mg Intravenous Q6H PRN Stephanie Bay MD        oxyTOCIN in sodium chloride 0.9% (Pitocin) 30 Units/500mL infusion premix  62.5-900 ifrah-units/min Intravenous Continuous Stephanie Bay MD        ceFAZolin (Ancef) 2 g in 20mL IV syringe premix  2 g Intravenous Once Stephanie Bay MD         No current [facility]-ordered outpatient medications on file.       No Known Allergies    Family History   Problem Relation Age of Onset    Breast Cancer Mother         >49yo    Other (Pacemaker in place) Mother     Other (Atrial fibrillation) Mother     Skin cancer Father      Social History     Socioeconomic History    Marital status:    Tobacco Use    Smoking status: Never     Passive exposure: Never    Smokeless tobacco: Never   Vaping Use    Vaping Use: Never used   Substance and Sexual Activity    Alcohol use: Not Currently    Drug use: Never    Sexual activity: Yes     Partners: Male       Available pre-op labs reviewed.  Lab Results   Component Value Date    WBC 7.8 2024    RBC 3.92 2024    HGB 12.8 2024    HCT 38.0 2024    MCV 96.9 2024    MCH 32.7 2024    MCHC 33.7 2024    RDW 13.2 2024    .0 2024             Vital Signs:  Body mass index is 25.1 kg/m².   weight is 77.1 kg (170 lb). Her oral temperature is 98.8 °F (37.1 °C). Her  blood pressure is 118/77 and her pulse is 79. Her respiration is 16.   Vitals:    02/03/24 0600 02/03/24 0605   BP: 118/77    Pulse: 79    Resp: 16    Temp: 98.8 °F (37.1 °C)    TempSrc: Oral    Weight:  77.1 kg (170 lb)        Anesthesia Evaluation     Patient summary reviewed and Nursing notes reviewed    Airway   Mallampati: II  TM distance: >3 FB  Neck ROM: full  Dental      Pulmonary - negative ROS and normal exam   Cardiovascular - negative ROS and normal exam  Exercise tolerance: good    Neuro/Psych - negative ROS     GI/Hepatic/Renal - negative ROS     Endo/Other - negative ROS     Comments: pregnancy  Abdominal  - normal exam     Other findings: pregnancy            Anesthesia Plan:   ASA:  2  Plan:   Spinal  Plan Comments: Discussed risks of neuraxial anesthesia, including, but not limited to: failure, backache, unilateral/patchy block, difficulty breathing, bleeding, infection, neurologic injury, post dural puncture headache, paralysis, and death. Patient understands risks and wishes to proceed with neuraxial anesthesia.    Informed Consent Plan and Risks Discussed With:  Patient      I have informed Nara Murillo and/or legal guardian or family member of the nature of the anesthetic plan, benefits, risks including possible dental damage if relevant, major complications, and any alternative forms of anesthetic management.   All of the patient's questions were answered to the best of my ability. The patient desires the anesthetic management as planned.  Aisha Lozano MD  2/3/2024 7:02 AM  Present on Admission:  **None**

## 2024-02-04 NOTE — ANESTHESIA POST-OP FOLLOW-UP NOTE
Nara Murillo is POD#1 after   Surgical Procedures       Case IDs Date Procedure Surgeon Location Status    [id number] 2/3/24  SECTION WITH BILATERAL SALPINGECTOMY Stephanie Bay MD [facility]        . Nara Murillo underwent spinal with duramorph for analgesia. Tolerated the procedure well. Today, denies headache, back pain, or residual LE weakness/numbness.  Injection site examined, no erythema, hematoma, or tenderness to palpation.   Pain score is 0/10.   No further anesthesia management needed at this time. Doing well on oral pain meds.     All anesthetic questions answered.     Kin Galvan MD

## 2024-02-04 NOTE — PLAN OF CARE
Problem: PAIN - ADULT  Goal: Verbalizes/displays adequate comfort level or patient's stated pain goal  Description: INTERVENTIONS:  - Encourage pt to monitor pain and request assistance  - Assess pain using appropriate pain scale  - Administer analgesics based on type and severity of pain and evaluate response  - Implement non-pharmacological measures as appropriate and evaluate response  - Consider cultural and social influences on pain and pain management  - Manage/alleviate anxiety  - Utilize distraction and/or relaxation techniques  - Monitor for opioid side effects  - Notify MD/LIP if interventions unsuccessful or patient reports new pain  - Anticipate increased pain with activity and pre-medicate as appropriate  Outcome: Progressing     Problem: ANXIETY  Goal: Will report anxiety at manageable levels  Description: INTERVENTIONS:  - Administer medication as ordered  - Teach and rehearse alternative coping skills  - Provide emotional support with 1:1 interaction with staff  Outcome: Progressing     Problem: GASTROINTESTINAL - ADULT  Goal: Minimal or absence of nausea and vomiting  Description: INTERVENTIONS:  - Maintain adequate hydration with IV or PO as ordered and tolerated  - Nasogastric tube to low intermittent suction as ordered  - Evaluate effectiveness of ordered antiemetic medications  - Provide nonpharmacologic comfort measures as appropriate  - Advance diet as tolerated, if ordered  - Obtain nutritional consult as needed  - Evaluate fluid balance  Outcome: Progressing  Goal: Maintains or returns to baseline bowel function  Description: INTERVENTIONS:  - Assess bowel function  - Maintain adequate hydration with IV or PO as ordered and tolerated  - Evaluate effectiveness of GI medications  - Encourage mobilization and activity  - Obtain nutritional consult as needed  - Establish a toileting routine/schedule  - Consider collaborating with pharmacy to review patient's medication profile  Outcome:  Progressing     Problem: POSTPARTUM  Goal: Long Term Goal:Experiences normal postpartum course  Description: INTERVENTIONS:  - Assess and monitor vital signs and lab values.  - Assess fundus and lochia.  - Provide ice/sitz baths for perineum discomfort.  - Monitor healing of incision/episiotomy/laceration, and assess for signs and symptoms of infection and hematoma.  - Assess bladder function and monitor for bladder distention.  - Provide/instruct/assist with pericare as needed.  - Provide VTE prophylaxis as needed.  - Monitor bowel function.  - Encourage ambulation and provide assistance as needed.  - Assess and monitor emotional status and provide social service/psych resources as needed.  - Utilize standard precautions and use personal protective equipment as indicated. Ensure aseptic care of all intravenous lines and invasive tubes/drains.  - Obtain immunization and exposure to communicable diseases history.  Outcome: Progressing  Goal: Optimize infant feeding at the breast  Description: INTERVENTIONS:  - Initiate breast feeding within first hour after birth.   - Monitor effectiveness of current breast feeding efforts.  - Assess support systems available to mother/family.  - Identify cultural beliefs/practices regarding lactation, letdown techniques, maternal food preferences.  - Assess mother's knowledge and previous experience with breast feeding.  - Provide information as needed about early infant feeding cues (e.g., rooting, lip smacking, sucking fingers/hand) versus late cue of crying.  - Discuss/demonstrate breast feeding aids (e.g., infant sling, nursing footstool/pillows, and breast pumps).  - Encourage mother/other family members to express feelings/concerns, and actively listen.  - Educate father/SO about benefits of breast feeding and how to manage common lactation challenges.  - Recommend avoidance of specific medications or substances incompatible with breast feeding.  - Assess and monitor for signs  of nipple pain/trauma.  - Instruct and provide assistance with proper latch.  - Review techniques for milk expression (breast pumping) and storage of breast milk. Provide pumping equipment/supplies, instructions and assistance, as needed.  - Encourage rooming-in and breast feeding on demand.  - Encourage skin-to-skin contact.  - Provide LC support as needed.  - Assess for and manage engorgement.  - Provide breast feeding education handouts and information on community breast feeding support.   Outcome: Progressing  Goal: Establishment of adequate milk supply with medication/procedure interruptions  Description: INTERVENTIONS:  - Review techniques for milk expression (breast pumping).   - Provide pumping equipment/supplies, instructions, and assistance until it is safe to breastfeed infant.  Outcome: Progressing  Goal: Appropriate maternal -  bonding  Description: INTERVENTIONS:  - Assess caregiver- interactions.  - Assess caregiver's emotional status and coping mechanisms.  - Encourage caregiver to participate in  daily care.  - Assess support systems available to mother/family.  - Provide /case management support as needed.  Outcome: Progressing

## 2024-02-04 NOTE — PROGRESS NOTES
[facility]    OB/Gyne Post  Section Progress Note      Nara Murillo Patient Status:  Inpatient    1985 MRN [id number]   Location [facility] 3SE Attending Stephanie Bay MD   Hosp Day # 1 PCP Kaylin Soto MD       Subjective     Good pain control. Tolerating present diet. Ambulating well. Voiding freely.    Objective   Vital signs in last 24 hours:  Temp:  [97.1 °F (36.2 °C)-98.2 °F (36.8 °C)] 98.2 °F (36.8 °C)  Pulse:  [56-82] 78  Resp:  [14-16] 16  BP: ()/(66-78) 103/66  SpO2:  [95 %-100 %] 100 %    Input/Output:    Intake/Output Summary (Last 24 hours) at 2024 1142  Last data filed at 2024 0434  Gross per 24 hour   Intake --   Output 1950 ml   Net -1950 ml       Constitutional: comfortable  Abdomen: soft nontender  Uterus: fundus below umbilicus, appropriately tender  Wound/Incision:  Clean / dry / intact  Extremities: No calf tenderness    Results:   Labs / Path / Radiology:    Recent Results (from the past 24 hour(s))   CBC W/ DIFFERENTIAL    Collection Time: 24 12:14 PM   Result Value Ref Range    WBC 13.5 (H) 4.0 - 11.0 x10(3) uL    RBC 3.58 (L) 3.80 - 5.30 x10(6)uL    HGB 11.7 (L) 12.0 - 16.0 g/dL    HCT 34.7 (L) 35.0 - 48.0 %    MCV 96.9 80.0 - 100.0 fL    MCH 32.7 26.0 - 34.0 pg    MCHC 33.7 31.0 - 37.0 g/dL    RDW-SD 46.6 (H) 35.1 - 46.3 fL    RDW 13.1 11.0 - 15.0 %    .0 150.0 - 450.0 10(3)uL    Neutrophil Absolute Prelim 12.20 (H) 1.50 - 7.70 x10 (3) uL    Neutrophil Absolute 12.20 (H) 1.50 - 7.70 x10(3) uL    Lymphocyte Absolute 0.79 (L) 1.00 - 4.00 x10(3) uL    Monocyte Absolute 0.39 0.10 - 1.00 x10(3) uL    Eosinophil Absolute 0.01 0.00 - 0.70 x10(3) uL    Basophil Absolute 0.02 0.00 - 0.20 x10(3) uL    Immature Granulocyte Absolute 0.05 0.00 - 1.00 x10(3) uL    Neutrophil % 90.6 %    Lymphocyte % 5.9 %    Monocyte % 2.9 %    Eosinophil % 0.1 %    Basophil % 0.1 %    Immature Granulocyte % 0.4 %       Specimens (From  admission, onward)      None            No results found.      Assessment/Plan   POD# 1 with following issues:   Patient Active Problem List   Diagnosis    Supervision of high-risk pregnancy of elderly primigravida    Pregnancy resulting from assisted reproductive technology, antepartum    Pregnancy   .    CPM, ambulate      Beata La MD  2/4/2024  11:42 AM

## 2024-02-04 NOTE — LACTATION NOTE
02/04/24 1030   Evaluation Type   Evaluation Type Inpatient   Problems identified   Problems Identified Other Follow up lactation visit, pt declines lactation needs/questions, states breastfeeding is going well.   Breastfeeding goal   Breastfeeding goal To maintain breast milk feeding per patient goal   Maternal Assessment   Prior breastfeeding experience (comment below) Multip   Breastfeeding Assistance LC assistance declined at this time   Guidelines for use of:   Other (comment) Encouraged to request assistance as needed.

## 2024-02-04 NOTE — DISCHARGE INSTRUCTIONS
Initiation of breast pumping after discharge:     - Add 1 pumping session a day, additional to the infant's feedings, 2-3 weeks after delivery.     - Pump both breasts for 15 mins, immediately after a breast feeding session.    - Pumping first thing in the morning will provide greater output.    - If you chose to pump more than once a day, you should be consistent every day to prevent a breast infection.      - Pump using an electric pump over a hands free pump (electric pumps provided stronger stimulation to the nipples).    - Store the breast milk in 2-3 oz containers.     - Label containers with date and time.    - Always feed oldest milk first.     Storage Guidelines:

## 2024-02-05 NOTE — LACTATION NOTE
LACTATION NOTE - MOTHER      Evaluation Type: Inpatient    Problems identified  Problems identified: Knowledge deficit;Nipple pain    Maternal history  Maternal history: Caesarean section;AMA    Breastfeeding goal  Breastfeeding goal: To maintain breast milk feeding per patient goal    Maternal Assessment  Bilateral Breasts: Filling;Symmetrical  Bilateral Nipples: Everted;Sore  Prior breastfeeding experience (comment below): Multip  Prior BF experience: comment: first child TT and only able to fed for a month or two, did have tie released  Breastfeeding Assistance: Breastfeeding assistance provided with permission;Breast exam provided with permission;Hand expression provided with permission    Pain assessment  Pain, additional: Pain location  Pain Location: Nipples  Location/Comment: very sore, and pain at 5/10 even with adjusting latch  Treatment of Sore Nipples: Hydrogel dressings as directed;Lanolin;Lanolin and breast shells during the day, hydrogel dressing at night;Deeper latch techniques;Expressed breast milk    Guidelines for use of:  Equipment: Breast shells;Hydrogel dressings;Lanolin;Nipple shield  Breast pump type: Hand Pump  Suggested use of pump: Pump each time a supplement is offered;Pump after nursing if a nipple shield is used;Pump if infant is not latching to breast  Other (comment): patient states would like help with feeding session later today, and states will call for help              Assisted with latching, very painful even with adjusting. Baby latched on both sides, discussed using nipple shield if gets too painful and might need to start pumping.

## 2024-02-05 NOTE — PROGRESS NOTES
[facility]    OB/GYNE Progress Note      Nara Murillo Patient Status:  Inpatient    1985 MRN [id number]   Location [facility] 3SE Attending Stephanie Bay MD   Hosp Day # 2 PCP Kaylin Soto MD       Assessment/Plan       Assessment  Problems:   Patient Active Problem List   Diagnosis    Supervision of high-risk pregnancy of elderly primigravida    Pregnancy resulting from assisted reproductive technology, antepartum    Pregnancy          POD #2  Routine cares        Subjective       Review of Systems:  Constitutional: feeling better, pain improved, and up in bed      Objective   Vital signs in last 24 hours:  Temp:  [98.2 °F (36.8 °C)-98.7 °F (37.1 °C)] 98.7 °F (37.1 °C)  Pulse:  [74-78] 74  Resp:  [16] 16  BP: (103-115)/(66-74) 115/74    Input/Output:  No intake or output data in the 24 hours ending 24 0856    Weight (Last 6):  Wt Readings from Last 6 Encounters:   24 170 lb (77.1 kg)   24 165 lb (74.8 kg)   22 138 lb 8 oz (62.8 kg)   21 143 lb (64.9 kg)   21 154 lb (69.9 kg)   21 153 lb (69.4 kg)     Body mass index is 25.1 kg/m².    Constitutional: comfortable  Uterus: fundus firm, fundus below umbilicus, and fundas appropriately tender  Wound/Incision:  clean, dry and intact  Extremities: No calf tenderness      Rockwell Catheter Information  Does patient have a rockwell catheter: no      Results:          No results found.    Patricia Day MD  2024  8:56 AM

## 2024-02-06 VITALS
SYSTOLIC BLOOD PRESSURE: 117 MMHG | WEIGHT: 170 LBS | HEART RATE: 72 BPM | TEMPERATURE: 98 F | DIASTOLIC BLOOD PRESSURE: 82 MMHG | BODY MASS INDEX: 25 KG/M2 | RESPIRATION RATE: 16 BRPM | OXYGEN SATURATION: 100 %

## 2024-02-06 RX ORDER — GABAPENTIN 300 MG/1
300 CAPSULE ORAL EVERY 8 HOURS PRN
Qty: 12 CAPSULE | Refills: 0 | Status: SHIPPED | OUTPATIENT
Start: 2024-02-06

## 2024-02-06 RX ORDER — ACETAMINOPHEN 500 MG
1000 TABLET ORAL EVERY 6 HOURS
Qty: 56 TABLET | Refills: 0 | Status: SHIPPED | OUTPATIENT
Start: 2024-02-06

## 2024-02-06 RX ORDER — IBUPROFEN 600 MG/1
600 TABLET ORAL EVERY 6 HOURS PRN
Qty: 32 TABLET | Refills: 0 | Status: SHIPPED | OUTPATIENT
Start: 2024-02-06

## 2024-02-06 NOTE — LACTATION NOTE
LACTATION NOTE - MOTHER      Evaluation Type: Inpatient    Problems identified  Problems identified: Knowledge deficit;Nipple pain  Problems Identified Other: persistent latch pain, suspected TT per mom         Breastfeeding goal  Breastfeeding goal: To maintain breast milk feeding per patient goal    Maternal Assessment  Bilateral Breasts: Filling;Symmetrical  Bilateral Nipples: Sore;Everted;Pink  Prior breastfeeding experience (comment below): Multip  Prior BF experience: comment: first child TT and only able to fed for a month or two, did have tie released  Breastfeeding Assistance: Breast exam provided with permission    Pain assessment  Pain, additional: Pain location  Pain Location: Nipples  Location/Comment: initial latch pain per mom, overall soreness  Treatment of Sore Nipples: Expressed breast milk;Lanolin;Deeper latch techniques;Hydrogel dressings as directed    Guidelines for use of:  Breast pump type: Hand Pump  Suggested use of pump: Pump each time a supplement is offered;Pump after nursing if a nipple shield is used;Pump if infant is not latching to breast  Other (comment): Pt states infant is latching well but she has persistent soreness. Offered to help with latching with a shield but pt declined, states she will try at home as well as do some pumping. D/c ed provided, all questions answered. Pt plans to see lactation at ped office and will discuss TT with ped at that time as well. Encouraged to call for support prn.

## 2024-02-06 NOTE — DISCHARGE SUMMARY
[facility]    Obstetrical Discharge Summary    Nara Murillo Patient Status:  Inpatient    1985 MRN [id number]   Location [facility] 3SE Attending Stephanie Bay MD   Hosp Day # 3 PCP Kaylin Soto MD     Date of Admission: 2/3/2024     Date of Discharge: 2024    Admitting Diagnosis: Repeat  section  Pregnancy  Desires permanent sterilization    Discharge Diagnosis: .Active Problems:    Pregnancy      Disposition: Home    Hospital Course:   Reason for Admission:  Nara Murillo is a 38 year old  who was admitted with Estimated Date of Delivery: 24. She presented for  with tubal sterilization.  Pregnancy was complicated by:  Patient Active Problem List    Diagnosis Date Noted    Pregnancy 2024    Supervision of high-risk pregnancy of elderly primigravida 2020    Pregnancy resulting from assisted reproductive technology, antepartum 2020       Hospital Course: This is a 38 year old  who was admitted with Estimated Date of Delivery: 24.  Pt presents for a repeat C/S and permanent sterilization.    Pt underwent a  without labor. Refer to  operative report for details.  Pt was transferred to mother/ baby.  She underwent an uncomplicated postpartum course.  Pt is being discharged today.    Date of Delivery: 2/3/2024    Time of Delivery: 8:12 AM   Delivery Type: Caesarean Section     Live   Information for the patient's :  Ubaldo Murillo [[id number]]   male  infant   Information for the patient's :  Ubaldo Murillo [[id number]]   8 lb 12 oz (3.97 kg)    Apgars:  1 minute: 9                5 minutes: 9                         10 minutes:        Postpartum Course: Her postpartum course was uncomplicated except for asymptomatic anemia.    Discharge Physical Exam:   /82 (BP Location: Left arm)   Pulse 72   Temp 97.9 °F (36.6 °C) (Oral)   Resp 16   Wt 170 lb (77.1 kg)   LMP 2023    SpO2 100%   Breastfeeding Yes   BMI 25.10 kg/m²   General appearance:  alert, appears stated age, cooperative and no distress  Abdominal: soft,  no rebound; no guarding; appropriately tender  Uterus: firm, nontender, below umbilicus  Incision: clean, dry and intact ; no ecchymosis or erythema  Pelvic: deferred  Extremities: Homans sign is negative, no sign of DVT; no c/c/e      Results:   Recent Results (from the past 336 hour(s))   ABORH (Blood Type)    Collection Time: 02/03/24  5:56 AM   Result Value Ref Range    ABO BLOOD TYPE O     RH BLOOD TYPE Positive    Antibody Screen    Collection Time: 02/03/24  5:56 AM   Result Value Ref Range    Antibody Screen Negative    CBC W/ DIFFERENTIAL    Collection Time: 02/03/24  5:56 AM   Result Value Ref Range    WBC 7.8 4.0 - 11.0 x10(3) uL    RBC 3.92 3.80 - 5.30 x10(6)uL    HGB 12.8 12.0 - 16.0 g/dL    HCT 38.0 35.0 - 48.0 %    MCV 96.9 80.0 - 100.0 fL    MCH 32.7 26.0 - 34.0 pg    MCHC 33.7 31.0 - 37.0 g/dL    RDW-SD 47.3 (H) 35.1 - 46.3 fL    RDW 13.2 11.0 - 15.0 %    .0 150.0 - 450.0 10(3)uL    Neutrophil Absolute Prelim 4.98 1.50 - 7.70 x10 (3) uL    Neutrophil Absolute 4.98 1.50 - 7.70 x10(3) uL    Lymphocyte Absolute 1.74 1.00 - 4.00 x10(3) uL    Monocyte Absolute 0.83 0.10 - 1.00 x10(3) uL    Eosinophil Absolute 0.15 0.00 - 0.70 x10(3) uL    Basophil Absolute 0.04 0.00 - 0.20 x10(3) uL    Immature Granulocyte Absolute 0.03 0.00 - 1.00 x10(3) uL    Neutrophil % 64.1 %    Lymphocyte % 22.4 %    Monocyte % 10.7 %    Eosinophil % 1.9 %    Basophil % 0.5 %    Immature Granulocyte % 0.4 %   ABORH Confirmation    Collection Time: 02/03/24  6:57 AM   Result Value Ref Range    ABO BLOOD TYPE O     RH BLOOD TYPE Positive    Specimen to Pathology Tissue    Collection Time: 02/03/24 10:23 AM   Result Value Ref Range    Case Report       Surgical Pathology                                Case: [id number]                                  Authorizing Provider:  Phu  MD Stephanie      Collected:           2024 10:23 AM          Ordering Location:     [facility]   Received:            2024 06:44 AM                                 [facility]                                                                 Pathologist:           Nhung Duran MD                                                                           Specimen:    Fallopian tubes bilateral, . 39.1.  with a bilateral salpingectomy            Final Diagnosis:         A. Bilateral fallopian tubes; bilateral salpingectomy:   Bilateral fallopian tubes, both completely transected.        Embedded Images      Clinical Information       Repeat  Section.        Gross Description       A. The specimen is received in formalin labeled \"Lidia, bilateral fallopian tubes\" and consists of two unoriented, completely transected, fimbriated fallopian tubes.    The first tube measures 8.5 x 0.7 cm and is grossly unremarkable.    Representative sections are submitted in cassette A1.   The second tube measures 8.2 x 0.7 cm and is grossly unremarkable.    Representative sections are submitted in cassette A2.     Nhung Duran M.D.      Interpretation Benign     CBC W/ DIFFERENTIAL    Collection Time: 24 12:14 PM   Result Value Ref Range    WBC 13.5 (H) 4.0 - 11.0 x10(3) uL    RBC 3.58 (L) 3.80 - 5.30 x10(6)uL    HGB 11.7 (L) 12.0 - 16.0 g/dL    HCT 34.7 (L) 35.0 - 48.0 %    MCV 96.9 80.0 - 100.0 fL    MCH 32.7 26.0 - 34.0 pg    MCHC 33.7 31.0 - 37.0 g/dL    RDW-SD 46.6 (H) 35.1 - 46.3 fL    RDW 13.1 11.0 - 15.0 %    .0 150.0 - 450.0 10(3)uL    Neutrophil Absolute Prelim 12.20 (H) 1.50 - 7.70 x10 (3) uL    Neutrophil Absolute 12.20 (H) 1.50 - 7.70 x10(3) uL    Lymphocyte Absolute 0.79 (L) 1.00 - 4.00 x10(3) uL    Monocyte Absolute 0.39 0.10 - 1.00 x10(3) uL    Eosinophil Absolute  0.01 0.00 - 0.70 x10(3) uL    Basophil Absolute 0.02 0.00 - 0.20 x10(3) uL    Immature Granulocyte Absolute 0.05 0.00 - 1.00 x10(3) uL    Neutrophil % 90.6 %    Lymphocyte % 5.9 %    Monocyte % 2.9 %    Eosinophil % 0.1 %    Basophil % 0.1 %    Immature Granulocyte % 0.4 %     No results for input(s): \"ABORH\" in the last 72 hours.  Recent Labs     24  1214   WBC 13.5*   HGB 11.7*   HCT 34.7*     No results found.    Complications: None    Consults: Yes Anesthesia and Neonatology    Surgeries:   Surgical Procedures       Case IDs Date Procedure Surgeon Location Status    [id number] 2/3/24  SECTION WITH BILATERAL SALPINGECTOMY Stephanie Bay MD [facility] L+D OR Comp            Pending Labs:     Discharge Plan:   Discharge Condition: Good    Discharge Meds:   Current Discharge Medication List        New Orders    Details   gabapentin 300 MG Oral Cap Take 1 capsule (300 mg total) by mouth every 8 (eight) hours as needed (For breakthrough moderate pain).      ibuprofen 600 MG Oral Tab Take 1 tablet (600 mg total) by mouth every 6 (six) hours as needed for Pain (moderate pain).      acetaminophen 500 MG Oral Tab Take 2 tablets (1,000 mg total) by mouth every 6 (six) hours.           Home Meds - Unchanged    Details   levothyroxine 25 MCG Oral Tab TAKE 1 TABLET BY MOUTH IN THE MORNING, 30 MINTUES BEFORE, FOOD, BEVERAGE, OTHER MEDS.      Prenatal Vit-Fe Fumarate-FA (PRENATAL VITAMIN OR) Take by mouth.                   Discharge Diet: General diet    Discharge Activity:   No heavy lifting >25-30 lbs  Abstain from sexual intercourse until cleared at 6 wks PP  No driving the first week and when on opiates  Activity as tolerated.    No exercise until cleared at 6 wks PP    Follow up:      Follow-up Information       [facility]. Call.    Specialty: Pediatrics  Why: As needed  Contact information:  [address]  [facility] [address] [phone]  Additional information:  Masks are  optional for all patients and visitors, unless otherwise indicated.             Stephanie Bay MD Follow up in 6 week(s).    Specialty: OBSTETRICS & GYNECOLOGY  Why: Call the office to schedule an appointment in 2 week for a wound check and in 6 weeks for postpartum visit.  If you have questions or concerns call the office sooner.  Contact information:  [address]  [address]  [place] [address] [phone]                                 Other Discharge Instructions:         Initiation of breast pumping after discharge:     - Add 1 pumping session a day, additional to the infant's feedings, 2-3 weeks after delivery.     - Pump both breasts for 15 mins, immediately after a breast feeding session.    - Pumping first thing in the morning will provide greater output.    - If you chose to pump more than once a day, you should be consistent every day to prevent a breast infection.      - Pump using an electric pump over a hands free pump (electric pumps provided stronger stimulation to the nipples).    - Store the breast milk in 2-3 oz containers.     - Label containers with date and time.    - Always feed oldest milk first.     Storage Guidelines:             Stephanie Bay MD  2/6/2024

## 2024-02-06 NOTE — PLAN OF CARE
Problem: PAIN - ADULT  Goal: Verbalizes/displays adequate comfort level or patient's stated pain goal  Description: INTERVENTIONS:  - Encourage pt to monitor pain and request assistance  - Assess pain using appropriate pain scale  - Administer analgesics based on type and severity of pain and evaluate response  - Implement non-pharmacological measures as appropriate and evaluate response  - Consider cultural and social influences on pain and pain management  - Manage/alleviate anxiety  - Utilize distraction and/or relaxation techniques  - Monitor for opioid side effects  - Notify MD/LIP if interventions unsuccessful or patient reports new pain  - Anticipate increased pain with activity and pre-medicate as appropriate  Outcome: Completed     Problem: ANXIETY  Goal: Will report anxiety at manageable levels  Description: INTERVENTIONS:  - Administer medication as ordered  - Teach and rehearse alternative coping skills  - Provide emotional support with 1:1 interaction with staff  Outcome: Completed     Problem: GASTROINTESTINAL - ADULT  Goal: Minimal or absence of nausea and vomiting  Description: INTERVENTIONS:  - Maintain adequate hydration with IV or PO as ordered and tolerated  - Nasogastric tube to low intermittent suction as ordered  - Evaluate effectiveness of ordered antiemetic medications  - Provide nonpharmacologic comfort measures as appropriate  - Advance diet as tolerated, if ordered  - Obtain nutritional consult as needed  - Evaluate fluid balance  Outcome: Completed  Goal: Maintains or returns to baseline bowel function  Description: INTERVENTIONS:  - Assess bowel function  - Maintain adequate hydration with IV or PO as ordered and tolerated  - Evaluate effectiveness of GI medications  - Encourage mobilization and activity  - Obtain nutritional consult as needed  - Establish a toileting routine/schedule  - Consider collaborating with pharmacy to review patient's medication profile  Outcome: Completed      Problem: POSTPARTUM  Goal: Long Term Goal:Experiences normal postpartum course  Description: INTERVENTIONS:  - Assess and monitor vital signs and lab values.  - Assess fundus and lochia.  - Provide ice/sitz baths for perineum discomfort.  - Monitor healing of incision/episiotomy/laceration, and assess for signs and symptoms of infection and hematoma.  - Assess bladder function and monitor for bladder distention.  - Provide/instruct/assist with pericare as needed.  - Provide VTE prophylaxis as needed.  - Monitor bowel function.  - Encourage ambulation and provide assistance as needed.  - Assess and monitor emotional status and provide social service/psych resources as needed.  - Utilize standard precautions and use personal protective equipment as indicated. Ensure aseptic care of all intravenous lines and invasive tubes/drains.  - Obtain immunization and exposure to communicable diseases history.  2/6/2024 1239 by Fatou Green RN  Outcome: Completed  2/6/2024 1238 by Fatou Green, RN  Outcome: Progressing  Goal: Optimize infant feeding at the breast  Description: INTERVENTIONS:  - Initiate breast feeding within first hour after birth.   - Monitor effectiveness of current breast feeding efforts.  - Assess support systems available to mother/family.  - Identify cultural beliefs/practices regarding lactation, letdown techniques, maternal food preferences.  - Assess mother's knowledge and previous experience with breast feeding.  - Provide information as needed about early infant feeding cues (e.g., rooting, lip smacking, sucking fingers/hand) versus late cue of crying.  - Discuss/demonstrate breast feeding aids (e.g., infant sling, nursing footstool/pillows, and breast pumps).  - Encourage mother/other family members to express feelings/concerns, and actively listen.  - Educate father/SO about benefits of breast feeding and how to manage common lactation challenges.  - Recommend avoidance of specific medications  or substances incompatible with breast feeding.  - Assess and monitor for signs of nipple pain/trauma.  - Instruct and provide assistance with proper latch.  - Review techniques for milk expression (breast pumping) and storage of breast milk. Provide pumping equipment/supplies, instructions and assistance, as needed.  - Encourage rooming-in and breast feeding on demand.  - Encourage skin-to-skin contact.  - Provide LC support as needed.  - Assess for and manage engorgement.  - Provide breast feeding education handouts and information on community breast feeding support.   2024 123 by Fatou Green RN  Outcome: Completed  2024 by Fatou Green RN  Outcome: Progressing  Goal: Establishment of adequate milk supply with medication/procedure interruptions  Description: INTERVENTIONS:  - Review techniques for milk expression (breast pumping).   - Provide pumping equipment/supplies, instructions, and assistance until it is safe to breastfeed infant.  2024 123 by Fatou Green RN  Outcome: Completed  2024 by Fatou Green RN  Outcome: Progressing  Goal: Appropriate maternal -  bonding  Description: INTERVENTIONS:  - Assess caregiver- interactions.  - Assess caregiver's emotional status and coping mechanisms.  - Encourage caregiver to participate in  daily care.  - Assess support systems available to mother/family.  - Provide /case management support as needed.  2024 by Fatou Green RN  Outcome: Completed  2024 by Fatou Green RN  Outcome: Progressing

## 2024-02-06 NOTE — PROGRESS NOTES
[facility]    OB/Gyne Post  Section Progress Note      Nara Murillo Patient Status:  Inpatient    1985 MRN [id number]   Location [facility] 3SE Attending Stephanie Bay MD   Hosp Day # 3 PCP Kaylin Soto MD       Subjective     Pt denies N/V/F/C/CP/SOB/palpitations, dizziness.      Good pain control.   Tolerating present diet.   Ambulating well.  Voiding freely.    Breastfeeding Yes   Formula Feeding: Yes   Tolerating Diet Yes   Flatus: Yes   BM: No       Objective   Vital signs in last 24 hours:  Temp:  [97.7 °F (36.5 °C)-97.9 °F (36.6 °C)] 97.9 °F (36.6 °C)  Pulse:  [70-72] 72  Resp:  [16-17] 16  BP: (114-117)/(71-82) 117/82    Input/Output:  No intake or output data in the 24 hours ending 24 1131    Constitutional: comfortable  Abdomen: soft; appropriately tender; non distended  BS: Present   Uterus: fundus 2 below  umbilicus,   appropriately tender  Wound/Incision:  Clean / dry / intact; no erythema and no ecchymosis  Extremities: No calf tenderness, No , No pitting edema.  Lochia: minimal      Results:   Labs / Path / Radiology:    No results found for this or any previous visit (from the past 24 hour(s)).    Specimens (From admission, onward)      None            No results found.      Assessment/Plan   POD#  3  with following issues:   Patient Active Problem List   Diagnosis    Supervision of high-risk pregnancy of elderly primigravida    Pregnancy resulting from assisted reproductive technology, antepartum    Pregnancy   .    This is a 38 year old  who presents s/p C/S for repeat C/S and bilateral salpingectomy POD # 3     Post-Op care:  -Pt doing well overall  -Pain controlled  -Breastfeeding, lactation consultant available for assistance  discharge home with discharge instructions reviewed in detail    2. Anemia:   -most likely 2/2 intra-op losses  -s/p C/S Hgb 11.7  -Asx and hemodynamically stable  -Will encourage cont PNV and increase intake  of iron rich foods      3. Hypothyroid  No acute issue  Continue levothyroxine    4. Disposition:  Pt comfortable about going home; will d/c home today;   Rx for Motrin, tylenol and gabaptenin PO given  Wound and home instructions given to patient and she verbalized understanding  Pt to call to make appt in 2 weeks for wound check and 6 wks postpartum  check  Pt to call if any concerns arise and make a n appointment for evaluation sooner if needed        Stephanie Bay MD  2/6/2024  11:31 AM

## 2024-03-15 ENCOUNTER — TELEPHONE (OUTPATIENT)
Dept: LACTATION | Facility: HOSPITAL | Age: 39
End: 2024-03-15

## 2024-03-15 NOTE — TELEPHONE ENCOUNTER
Telephone consult regarding mastitis.  Nara is currently on antibiotics and called her physician this morning as well.  She states that she was feeling better on antibiotics, taking sunflower lecithin and ibuprofen, but this morning it flared up a little bit and she wanted to know if there was anything else to try.  Discussed using cooling packs between feedings, 20 minutes max at a time, continue to nurse her baby on demand, if engorged to hand express a little, consider and discuss with physician the use of probiotics, wearing a supportive bra that isn't too tight, and to keep any breast massage light if she feels a need to do that.    Encouragement given.   
88

## (undated) DEVICE — TAPE SURG W4INXL10YD SFT CLTH H2O RESIST

## (undated) NOTE — LETTER
[facility]  Administration of Anesthesia  I Nara Murillo agree to be cared for by a physician anesthesiologist alone and/or with a nurse anesthetist, who is specially trained to monitor me and give me medicine to put me to sleep or keep me comfortable during my procedure    I understand that my anesthesiologist and/or anesthetist is not an employee or agent of [facility] or [facility] [employer]. He or she works for [facility]    As the patient asking for anesthesia services, I agree to:  Allow the anesthesiologist (anesthesia doctor) to give me medicine and do additional procedures as necessary. Some examples are: Starting or using an “IV” to give me medicine, fluids or blood during my procedure, and having a breathing tube placed to help me breathe when I’m asleep (intubation). In the event that my heart stops working properly, I understand that my anesthesiologist will make every effort to sustain my life, unless otherwise directed by [facility] Do Not Resuscitate documents.  Tell my anesthesia doctor before my procedure:  If I am pregnant.  The last time that I ate or drank.  iii. All of the medicines I take (including prescriptions, herbal supplements, and pills I can buy without a prescription (including street drugs/illegal medications). Failure to inform my anesthesiologist about these medicines may increase my risk of anesthetic complications.  iv.If I am allergic to anything or have had a reaction to anesthesia before.  I understand how the anesthesia medicine will help me (benefits).  I understand that with any type of anesthesia medicine there are risks:  The most common risks are: nausea, vomiting, sore throat, muscle soreness, damage to my eyes, mouth, or teeth (from breathing tube placement).  Rare risks include: remembering what happened during my procedure, allergic reactions to medications, injury to my airway, heart, lungs, vision, nerves, or  muscles and in extremely rare instances death.  My doctor has explained to me other choices available to me for my care (alternatives).  Pregnant Patients (“epidural”):  I understand that the risks of having an epidural (medicine given into my back to help control pain during labor), include itching, low blood pressure, difficulty urinating, headache or slowing of the baby’s heart. Very rare risks include infection, bleeding, seizure, irregular heart rhythms and nerve injury.  Regional Anesthesia (“spinal”, “epidural”, & “nerve blocks”):  I understand that rare but potential complications include headache, bleeding, infection, seizure, irregular heart rhythms, and nerve injury.    _____________________________________________________________________________  Patient (or Representative) Signature/Relationship to Patient  Date   Time    _____________________________________________________________________________   Name (if used)    Language/Organization   Time    _____________________________________________________________________________  Nurse Anesthetist Signature     Date   Time  _____________________________________________________________________________  Anesthesiologist Signature     Date   Time  I have discussed the procedure and information above with the patient (or patient’s representative) and answered their questions. The patient or their representative has agreed to have anesthesia services.    _____________________________________________________________________________  Witness        Date   Time  I have verified that the signature is that of the patient or patient’s representative, and that it was signed before the procedure  Patient Name: Nara Murillo     : 1985                 Printed: 2/3/2024 at 5:37 AM    Medical Record #: [id number]                                            Page 1 of 1  ----------ANESTHESIA CONSENT----------